# Patient Record
(demographics unavailable — no encounter records)

---

## 2025-07-10 NOTE — PHYSICAL EXAM
[Well-appearing] : well-appearing [Normocephalic] : normocephalic [Anterior fontanel- Open] : anterior fontanel- open [Anterior fontanel- Soft] : anterior fontanel- soft [Anterior fontanel- Flat] : anterior fontanel- flat [No dysmorphic facial features] : no dysmorphic facial features [No ocular abnormalities] : no ocular abnormalities [Neck supple] : neck supple [Lungs clear] : lungs clear [Heart sounds regular in rate and rhythm] : heart sounds regular in rate and rhythm [Soft] : soft [No organomegaly] : no organomegaly [No abnormal neurocutaneous stigmata or skin lesions] : no abnormal neurocutaneous stigmata or skin lesions [Straight] : straight [No bryn or dimples] : no bryn or dimples [No deformities] : no deformities [Pupils reactive to light] : pupils reactive to light [Turns to light] : turns to light [Tracks face, light or objects with full extraocular movements] : tracks face, light or objects with full extraocular movements [No facial asymmetry or weakness] : no facial asymmetry or weakness [No nystagmus] : no nystagmus [Responds to voice/sounds] : responds to voice/sounds [Midline tongue] : midline tongue [No fasciculations] : no fasciculations [Normal axial and appendicular muscle tone with symmetric limb movements] : normal axial and appendicular muscle tone with symmetric limb movements [Normal bulk] : normal bulk [Lift head in prone] : lift head in prone [No abnormal involuntary movements] : no abnormal involuntary movements [2+ biceps] : 2+ biceps [Knee jerks] : knee jerks [Ankle jerks] : ankle jerks [No ankle clonus] : no ankle clonus [Rodrick] : Rodrick [Responds to touch and tickle] : responds to touch and tickle [de-identified] : HC: 36 cm  [de-identified] : Mild head lag on pull to sit  [de-identified] : Symmetric Rodrick

## 2025-07-10 NOTE — DEVELOPMENTAL MILESTONES
[Normal] : Developmental history within normal limits [Regards own hand] : regards own hand [Follows past midline] : follows past midline ["OOO/AAH"] : "oemily/yesenia" [Smiles spontaneously] : does not smile spontaneously [Squeals] : does not squeal [Laughs] : does not laugh [Bears weight on legs] : does not bear weight on legs [Sit-head steady] : no sit-head steady

## 2025-07-10 NOTE — HISTORY OF PRESENT ILLNESS
[FreeTextEntry1] : 25  ue to delivery history, aEEG performed  which was suggestive of moderate encephalopathy, so decision to initiate therapeutic hypothermia (-, rewarmed on ). veeg result during maintenance phase of cooling  showed no seizure or epileptic activity, Nonspecific diffuse dysfunction of neuronal activity, can be observed with cooling. Repeat Veeg during rewarming phase result: "abnormal EEG: Prolonged interburst intervals during quiet sleep.; Clinical Correlation: The above findings are indicative of a mild diffuse cerebral dysfunction. No recorded events or seizures." MRI : Subacute subdural hematomas; infra and supratentorial with no mass effect on the underlying brain. No signal abnormalities within the brain parenchyma itself. 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 - Was on precedex drip from - -  head ultrasound result no intraventricular hemorrhage, Subgaleal  2025  mpression: Abnormal EEG: Prolonged interburst intervals during quiet sleep.  Clinical Correlation: The above findings are indicative of a mild diffuse cerebral dysfunction. No recorded events or seizures.    brain MRI: MPRESSION: Subacute subdural hematomas; infra and supratentorial with no mass effect on the underlying brain. No signal abnormalities within the brain parenchyma itself  7/10/2025 Ped neurology   with the parents. Induced at 37 weeks because of large size. The parents reported that the left shoulder was stuck Apgar scores: 2/6/7 . Temporary left arm decreased movement noted. Improving.  Begins to smile, turns head side to side.  Blood gas: 7.07,-11. Diagnosed with mild encephalopathy. Brain cooling

## 2025-07-10 NOTE — ASSESSMENT
[FreeTextEntry1] : S/P difficult delivery. Mild  encephalopathy  On Exam: Mild hypotonia. No asymmetry